# Patient Record
Sex: FEMALE | Race: BLACK OR AFRICAN AMERICAN | NOT HISPANIC OR LATINO | URBAN - METROPOLITAN AREA
[De-identification: names, ages, dates, MRNs, and addresses within clinical notes are randomized per-mention and may not be internally consistent; named-entity substitution may affect disease eponyms.]

---

## 2024-04-13 ENCOUNTER — EMERGENCY (EMERGENCY)
Facility: HOSPITAL | Age: 64
LOS: 0 days | Discharge: HOME | End: 2024-04-14
Attending: EMERGENCY MEDICINE | Admitting: HOSPITALIST
Payer: COMMERCIAL

## 2024-04-13 VITALS
DIASTOLIC BLOOD PRESSURE: 100 MMHG | HEART RATE: 84 BPM | TEMPERATURE: 98 F | RESPIRATION RATE: 16 BRPM | SYSTOLIC BLOOD PRESSURE: 183 MMHG | HEIGHT: 66 IN | WEIGHT: 169.98 LBS | OXYGEN SATURATION: 98 %

## 2024-04-13 DIAGNOSIS — I10 ESSENTIAL (PRIMARY) HYPERTENSION: ICD-10-CM

## 2024-04-13 DIAGNOSIS — R07.89 OTHER CHEST PAIN: ICD-10-CM

## 2024-04-13 DIAGNOSIS — R53.83 OTHER FATIGUE: ICD-10-CM

## 2024-04-13 DIAGNOSIS — R51.9 HEADACHE, UNSPECIFIED: ICD-10-CM

## 2024-04-13 DIAGNOSIS — R90.89 OTHER ABNORMAL FINDINGS ON DIAGNOSTIC IMAGING OF CENTRAL NERVOUS SYSTEM: ICD-10-CM

## 2024-04-13 DIAGNOSIS — I16.0 HYPERTENSIVE URGENCY: ICD-10-CM

## 2024-04-13 DIAGNOSIS — R53.1 WEAKNESS: ICD-10-CM

## 2024-04-13 LAB
ALBUMIN SERPL ELPH-MCNC: 3.5 G/DL — SIGNIFICANT CHANGE UP (ref 3.3–5)
ALP SERPL-CCNC: 99 U/L — SIGNIFICANT CHANGE UP (ref 40–120)
ALT FLD-CCNC: 19 U/L — SIGNIFICANT CHANGE UP (ref 12–78)
ANION GAP SERPL CALC-SCNC: 6 MMOL/L — SIGNIFICANT CHANGE UP (ref 5–17)
APPEARANCE UR: CLEAR — SIGNIFICANT CHANGE UP
APTT BLD: 30.3 SEC — SIGNIFICANT CHANGE UP (ref 24.5–35.6)
AST SERPL-CCNC: 13 U/L — LOW (ref 15–37)
BACTERIA # UR AUTO: ABNORMAL /HPF
BASOPHILS # BLD AUTO: 0.04 K/UL — SIGNIFICANT CHANGE UP (ref 0–0.2)
BASOPHILS NFR BLD AUTO: 1.1 % — SIGNIFICANT CHANGE UP (ref 0–2)
BILIRUB SERPL-MCNC: 0.5 MG/DL — SIGNIFICANT CHANGE UP (ref 0.2–1.2)
BILIRUB UR-MCNC: NEGATIVE — SIGNIFICANT CHANGE UP
BUN SERPL-MCNC: 12 MG/DL — SIGNIFICANT CHANGE UP (ref 7–23)
CALCIUM SERPL-MCNC: 9.2 MG/DL — SIGNIFICANT CHANGE UP (ref 8.5–10.1)
CHLORIDE SERPL-SCNC: 112 MMOL/L — HIGH (ref 96–108)
CK MB BLD-MCNC: <1.2 % — SIGNIFICANT CHANGE UP (ref 0–3.5)
CK MB CFR SERPL CALC: <1 NG/ML — SIGNIFICANT CHANGE UP (ref 0.5–3.6)
CK SERPL-CCNC: 85 U/L — SIGNIFICANT CHANGE UP (ref 26–192)
CO2 SERPL-SCNC: 26 MMOL/L — SIGNIFICANT CHANGE UP (ref 22–31)
COLOR SPEC: YELLOW — SIGNIFICANT CHANGE UP
CREAT SERPL-MCNC: 0.85 MG/DL — SIGNIFICANT CHANGE UP (ref 0.5–1.3)
DIFF PNL FLD: NEGATIVE — SIGNIFICANT CHANGE UP
EGFR: 76 ML/MIN/1.73M2 — SIGNIFICANT CHANGE UP
EOSINOPHIL # BLD AUTO: 0.09 K/UL — SIGNIFICANT CHANGE UP (ref 0–0.5)
EOSINOPHIL NFR BLD AUTO: 2.4 % — SIGNIFICANT CHANGE UP (ref 0–6)
GLUCOSE SERPL-MCNC: 93 MG/DL — SIGNIFICANT CHANGE UP (ref 70–99)
GLUCOSE UR QL: NEGATIVE MG/DL — SIGNIFICANT CHANGE UP
HCT VFR BLD CALC: 38.9 % — SIGNIFICANT CHANGE UP (ref 34.5–45)
HGB BLD-MCNC: 12.2 G/DL — SIGNIFICANT CHANGE UP (ref 11.5–15.5)
HYALINE CASTS # UR AUTO: PRESENT
IMM GRANULOCYTES NFR BLD AUTO: 0.3 % — SIGNIFICANT CHANGE UP (ref 0–0.9)
INR BLD: 0.94 RATIO — SIGNIFICANT CHANGE UP (ref 0.85–1.18)
KETONES UR-MCNC: NEGATIVE MG/DL — SIGNIFICANT CHANGE UP
LEUKOCYTE ESTERASE UR-ACNC: NEGATIVE — SIGNIFICANT CHANGE UP
LYMPHOCYTES # BLD AUTO: 1.14 K/UL — SIGNIFICANT CHANGE UP (ref 1–3.3)
LYMPHOCYTES # BLD AUTO: 30.2 % — SIGNIFICANT CHANGE UP (ref 13–44)
MAGNESIUM SERPL-MCNC: 2.1 MG/DL — SIGNIFICANT CHANGE UP (ref 1.6–2.6)
MCHC RBC-ENTMCNC: 25.1 PG — LOW (ref 27–34)
MCHC RBC-ENTMCNC: 31.4 G/DL — LOW (ref 32–36)
MCV RBC AUTO: 80 FL — SIGNIFICANT CHANGE UP (ref 80–100)
MONOCYTES # BLD AUTO: 0.29 K/UL — SIGNIFICANT CHANGE UP (ref 0–0.9)
MONOCYTES NFR BLD AUTO: 7.7 % — SIGNIFICANT CHANGE UP (ref 2–14)
NEUTROPHILS # BLD AUTO: 2.2 K/UL — SIGNIFICANT CHANGE UP (ref 1.8–7.4)
NEUTROPHILS NFR BLD AUTO: 58.3 % — SIGNIFICANT CHANGE UP (ref 43–77)
NITRITE UR-MCNC: NEGATIVE — SIGNIFICANT CHANGE UP
NRBC # BLD: 0 /100 WBCS — SIGNIFICANT CHANGE UP (ref 0–0)
NT-PROBNP SERPL-SCNC: 38 PG/ML — SIGNIFICANT CHANGE UP (ref 0–125)
PH UR: 7 — SIGNIFICANT CHANGE UP (ref 5–8)
PLATELET # BLD AUTO: 262 K/UL — SIGNIFICANT CHANGE UP (ref 150–400)
POTASSIUM SERPL-MCNC: 3.5 MMOL/L — SIGNIFICANT CHANGE UP (ref 3.5–5.3)
POTASSIUM SERPL-SCNC: 3.5 MMOL/L — SIGNIFICANT CHANGE UP (ref 3.5–5.3)
PROT SERPL-MCNC: 7.1 GM/DL — SIGNIFICANT CHANGE UP (ref 6–8.3)
PROT UR-MCNC: NEGATIVE MG/DL — SIGNIFICANT CHANGE UP
PROTHROM AB SERPL-ACNC: 11.3 SEC — SIGNIFICANT CHANGE UP (ref 9.5–13)
RBC # BLD: 4.86 M/UL — SIGNIFICANT CHANGE UP (ref 3.8–5.2)
RBC # FLD: 14.1 % — SIGNIFICANT CHANGE UP (ref 10.3–14.5)
RBC CASTS # UR COMP ASSIST: 4 /HPF — SIGNIFICANT CHANGE UP (ref 0–4)
SODIUM SERPL-SCNC: 144 MMOL/L — SIGNIFICANT CHANGE UP (ref 135–145)
SP GR SPEC: 1.02 — SIGNIFICANT CHANGE UP (ref 1–1.03)
TROPONIN I, HIGH SENSITIVITY RESULT: 3.8 NG/L — SIGNIFICANT CHANGE UP
UROBILINOGEN FLD QL: 1 MG/DL — SIGNIFICANT CHANGE UP (ref 0.2–1)
WBC # BLD: 3.77 K/UL — LOW (ref 3.8–10.5)
WBC # FLD AUTO: 3.77 K/UL — LOW (ref 3.8–10.5)
WBC UR QL: 6 /HPF — HIGH (ref 0–5)

## 2024-04-13 PROCEDURE — 70450 CT HEAD/BRAIN W/O DYE: CPT | Mod: 26,MC

## 2024-04-13 PROCEDURE — 99222 1ST HOSP IP/OBS MODERATE 55: CPT

## 2024-04-13 PROCEDURE — 93010 ELECTROCARDIOGRAM REPORT: CPT

## 2024-04-13 PROCEDURE — 71045 X-RAY EXAM CHEST 1 VIEW: CPT | Mod: 26

## 2024-04-13 PROCEDURE — 99285 EMERGENCY DEPT VISIT HI MDM: CPT

## 2024-04-13 RX ORDER — AMLODIPINE BESYLATE 2.5 MG/1
5 TABLET ORAL DAILY
Refills: 0 | Status: DISCONTINUED | OUTPATIENT
Start: 2024-04-13 | End: 2024-04-14

## 2024-04-13 RX ORDER — HYDRALAZINE HCL 50 MG
5 TABLET ORAL ONCE
Refills: 0 | Status: COMPLETED | OUTPATIENT
Start: 2024-04-13 | End: 2024-04-13

## 2024-04-13 RX ADMIN — Medication 5 MILLIGRAM(S): at 11:20

## 2024-04-13 RX ADMIN — AMLODIPINE BESYLATE 5 MILLIGRAM(S): 2.5 TABLET ORAL at 17:54

## 2024-04-13 NOTE — PATIENT PROFILE ADULT - FUNCTIONAL ASSESSMENT - DAILY ACTIVITY SECTION LABEL
Debridement Text: The wound edges were debrided prior to proceeding with the closure to facilitate wound healing. .

## 2024-04-13 NOTE — H&P ADULT - HISTORY OF PRESENT ILLNESS
Ms. Angeles is a 63 y/o female from home w/ no known PMH presents to the hospital w/ c/o feeling unwell and headache x3 days. Patient reports that she went to Health system today to buy groceries but felt unwell and asked to get her BP checked, however, the machine was broken. Hence, she decided to visit Urgent care but felt too weak that she called 911. She has headaches w/ pressure like sensation behind her eyes, denies other symptoms like nausea, vomiting, focal weakness, blurry vision or other complaints. She has no known hx of HTN but for last few visits she was told that her BP readings were a bit elevated. She denies any chest pain or other symptoms.     In ED, her SBP was noted to be in 180's. She was given hydralazine 5mg IV push.

## 2024-04-13 NOTE — ED PROVIDER NOTE - ACUTE OR EVOLVING MI?
Units: mg Dose Administered (Numbers Only - Mg, G, Mcg, Units, Cc): 0 Medication (1) And Associated J-Code Units: Ustekinumab (Stelara), 1mg Detail Level: None Render J-Code Information In Note?: yes Units: cc Hide Second Medication?: No Consent: The risks of the medication were reviewed with the patient. Route: SC Procedure Information: Please note that the numeric value listed in the Medication (1) and associated J-code units and Medication (2) and associated J-code units variables are j-code amounts and do not represent either the concentration or the total amount of the medications injected.  I strongly recommend selecting no to the Render J-code information in note question. This will allow your note to be more clear. If you are billing j-codes with your injection codes you need to document the total amount of the medication injected. This amount should match the j-code units. For example, if you are injecting Triamcinolone 40mg as an intramuscular injection you would select 40 for the dose field and mg for the units. This would allow you to document  with 4 units (40mg = 10mg x 4). The total volume is not used to calculate j-codes only the amount of the medication administered. Post-Care Instructions: I reviewed with the patient in detail post-care instructions. Patient understands to keep the injection sites clean and call the clinic if there is any redness, swelling or pain. Expiration Date (Optional): July 2023 Lot # (Optional): [id number] Dose Administered (Numbers Only - Mg, G, Mcg, Units, Cc): 45 no

## 2024-04-13 NOTE — ED ADULT NURSE NOTE - NSFALLUNIVINTERV_ED_ALL_ED
Bed/Stretcher in lowest position, wheels locked, appropriate side rails in place/Call bell, personal items and telephone in reach/Instruct patient to call for assistance before getting out of bed/chair/stretcher/Non-slip footwear applied when patient is off stretcher/[place] to call system/Physically safe environment - no spills, clutter or unnecessary equipment/Purposeful proactive rounding/Room/bathroom lighting operational, light cord in reach

## 2024-04-13 NOTE — ED PROVIDER NOTE - PROGRESS NOTE DETAILS
pt is not a candidate for tnk and thrombectomy due to last known well was more than five days ago and NIH scores is zero Dr. Dahl is notified.

## 2024-04-13 NOTE — ED PROVIDER NOTE - CONSTITUTIONAL, MLM
Well appearing, awake, alert, oriented to person, place, time/situation and in no apparent distress. Speaking in clear full sentences no nasal flaring no shoulders retractions no diaphoresis not holding her head/chest/abdomen, appears comfortable lying in the stretcher in a bright light room normal...

## 2024-04-13 NOTE — H&P ADULT - NSHPPHYSICALEXAM_GEN_ALL_CORE
Vital Signs Last 24 Hrs  T(C): 36.8 (13 Apr 2024 18:30), Max: 37.1 (13 Apr 2024 16:37)  T(F): 98.2 (13 Apr 2024 18:30), Max: 98.8 (13 Apr 2024 16:37)  HR: 70 (13 Apr 2024 18:30) (58 - 84)  BP: 144/80 (13 Apr 2024 18:30) (142/73 - 183/100)  BP(mean): 101 (13 Apr 2024 18:30) (101 - 101)  RR: 16 (13 Apr 2024 18:30) (14 - 20)  SpO2: 100% (13 Apr 2024 18:30) (98% - 100%)    Parameters below as of 13 Apr 2024 18:30  Patient On (Oxygen Delivery Method): room air    CONSTITUTIONAL: Well appearing, well nourished, awake, alert and in no apparent distress  ENMT: Airway patent, Nasal mucosa clear. Mouth with normal mucosa. Throat has no vesicles, no oropharyngeal exudates and uvula is midline.  EYES: Clear bilaterally, pupils equal, round and reactive to light. EOMI.  CARDIAC: Normal rate, regular rhythm.  Heart sounds S1, S2.  No murmurs, rubs or gallops   RESPIRATORY: Breath sounds clear and equal bilaterally. No wheezes, rales or rhonchi  MUSCULOSKELETAL: Spine appears normal, range of motion is not limited, no muscle or joint tenderness  EXTREMITIES: No edema, cyanosis or deformity   NEUROLOGICAL: Alert and oriented, no focal deficits, no motor or sensory deficits.  SKIN: No rash, skin turgor

## 2024-04-13 NOTE — ED PROVIDER NOTE - OBJECTIVE STATEMENT
64 years old female here by ems from the [facility] [place] c/o feeling fatigue and sluggish this morning. Pt also c/o intermittent left side chest discomfort/pressure pain 3/10 for two days Pt also sts she had headache with elevated bp two days ago. Pt now pt denies headache, recent hx of traveling or trauma, dizziness, blurred visions, light sensitivities, focal/distal weakness or numbness, neck/back/hips/calfs pain, cough, sob, nausea, vomiting, fever, chills, abd pain, dysuria or irregular bowel movements. Pt sts she has no family hx of heart attack pt never smoke. 64 years old female here by ems from the [facility] [place] c/o feeling fatigue and sluggish this morning However she sts she has had feeling sluggish for about 5 days. Pt also c/o intermittent left side chest discomfort/pressure pain 3/10 for two days Pt also sts she had headache with elevated bp two days ago. Pt now pt denies headache, recent hx of traveling or trauma, dizziness, blurred visions, light sensitivities, focal/distal weakness or numbness, difficulty to walk or keep balance, neck/back/hips/calfs pain, cough, sob, nausea, vomiting, fever, chills, abd pain, dysuria or irregular bowel movements. Pt sts she has no family hx of heart attack pt never smoke.

## 2024-04-13 NOTE — ED ADULT TRIAGE NOTE - CHIEF COMPLAINT QUOTE
brought by ems for not feeling well . pt states she was at the store and she just did not feel right and she felt a pressure on her chest . no past medical history .

## 2024-04-13 NOTE — ED PROVIDER NOTE - CLINICAL SUMMARY MEDICAL DECISION MAKING FREE TEXT BOX
pt came in with two days of generalized weakness intermittent chest pain and elevated bp ekg no st-t elevation sinus giselle at 58 bpm and LVH. bed side bp 161/98 Labs ct head cxr are ordered. pt came in with two days of generalized weakness intermittent chest pain and elevated bp ekg no st-t elevation sinus giselle at 58 bpm and LVH. bed side bp 161/98 Labs ct head cxr are ordered.  ct head + hypodensity of bilateral occipital lobe possible early infarct. Pt's NIH score is zero and last known well was more than five days ago. Pt will be admitted for mri of brain.  Pt has no focal neuro deficits on examine and NIH score is zero pt can be admitted to Observation

## 2024-04-13 NOTE — ED ADULT NURSE NOTE - OBJECTIVE STATEMENT
Patient A+OX4 presents with chest pain. Patient stated she started to not feel well thursday, she woke up with headache, "felt my pressure was up." Today at [facility], patient she stated she, "felt chest pressure, fatigue, and like I had a lack of oxygen." Patient describes the c/p as 3/10, pressure like, denies any SOB, TORRES, dizziness, back pain. patient placed on cardiac monitor

## 2024-04-13 NOTE — H&P ADULT - ASSESSMENT
Ms. Angeles is a 65 y/o female from home w/ no known PMH presents to the hospital w/ c/o feeling unwell and headache x3 days, found to have elevated SBP in 180's. Admitted for HTN Urgency     A/P:  #HTN Urgency   #Abnormal CT head  #DVT ppx     Plan:  -Pt w/ elevated SBP in 180. CT head-Subtle hypodensity in the bilateral occipital lobes. Cannot exclude early infarction or posterior reversible encephalopathy versus artifact  -s/p hydralazine in ED- BP improved.   -CT findings could be related to PRES- will get MR head for further evaluation  -Start CCB, monitor VS

## 2024-04-14 VITALS
SYSTOLIC BLOOD PRESSURE: 136 MMHG | TEMPERATURE: 99 F | OXYGEN SATURATION: 100 % | DIASTOLIC BLOOD PRESSURE: 79 MMHG | RESPIRATION RATE: 19 BRPM | HEART RATE: 63 BPM

## 2024-04-14 LAB
HCV AB S/CO SERPL IA: 0.08 S/CO — SIGNIFICANT CHANGE UP (ref 0–0.99)
HCV AB SERPL-IMP: SIGNIFICANT CHANGE UP

## 2024-04-14 PROCEDURE — 70544 MR ANGIOGRAPHY HEAD W/O DYE: CPT | Mod: 26,59

## 2024-04-14 PROCEDURE — 70496 CT ANGIOGRAPHY HEAD: CPT | Mod: 26,MC

## 2024-04-14 PROCEDURE — 70498 CT ANGIOGRAPHY NECK: CPT | Mod: 26,MC

## 2024-04-14 PROCEDURE — 70549 MR ANGIOGRAPH NECK W/O&W/DYE: CPT | Mod: 26,MC

## 2024-04-14 PROCEDURE — 99235 HOSP IP/OBS SAME DATE MOD 70: CPT

## 2024-04-14 PROCEDURE — 70551 MRI BRAIN STEM W/O DYE: CPT | Mod: 26,MC

## 2024-04-14 PROCEDURE — 70450 CT HEAD/BRAIN W/O DYE: CPT | Mod: 26,MC,59

## 2024-04-14 RX ORDER — AMLODIPINE BESYLATE 2.5 MG/1
1 TABLET ORAL
Qty: 30 | Refills: 0
Start: 2024-04-14 | End: 2024-05-13

## 2024-04-14 RX ADMIN — AMLODIPINE BESYLATE 5 MILLIGRAM(S): 2.5 TABLET ORAL at 08:41

## 2024-04-14 NOTE — DISCHARGE NOTE PROVIDER - NSDCCPCAREPLAN_GEN_ALL_CORE_FT
PRINCIPAL DISCHARGE DIAGNOSIS  Diagnosis: Generalized weakness  Assessment and Plan of Treatment: Ms. Angeles is a 63 y/o female from home w/ no known PMH presents to the hospital w/ c/o feeling unwell and headache x3 days, found to have elevated SBP in 180's. Admitted for HTN Urgency   A/P:  #HTN Urgency   #Abnormal CT head  #DVT ppx   Plan:  -Pt w/ elevated SBP in 180. CT head-Subtle hypodensity in the bilateral occipital lobes. Cannot exclude early infarction or posterior reversible encephalopathy versus artifact   MRI/MRA results  No acute intracranial hemorrhage or acute infarct.  No hemodynamically significant stenosis.  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 CTA head recommended for further   characterization   CTA head and neck done and showed no aneurysm   -symptoms likely related to uncontrolled hypertension   -  will need amlodipine on discharge and pcp follow up

## 2024-04-14 NOTE — DISCHARGE NOTE PROVIDER - HOSPITAL COURSE
Ms. Angeles is a 63 y/o female from home w/ no known PMH presents to the hospital w/ c/o feeling unwell and headache x3 days, found to have elevated SBP in 180's. Admitted for HTN Urgency     A/P:  #HTN Urgency   #Abnormal CT head  #DVT ppx     Plan:  -Pt w/ elevated SBP in 180. CT head-Subtle hypodensity in the bilateral occipital lobes. Cannot exclude early infarction or posterior reversible encephalopathy versus artifact     MRI/MRA results  No acute intracranial hemorrhage or acute infarct.    No hemodynamically significant stenosis.    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 CTA head recommended for further   characterization     CTA head and neck done and showed no aneurysm   -symptoms likely related to uncontrolled hypertension   -  will need amlodipine on discharge and pcp follow up

## 2024-04-14 NOTE — DISCHARGE NOTE NURSING/CASE MANAGEMENT/SOCIAL WORK - NSDCPEFALRISK_GEN_ALL_CORE
For information on Fall & Injury Prevention, visit: [email or web address] OR  [email or web address] OR  https://www.cdc.gov/steadi/patient.html

## 2024-04-14 NOTE — DISCHARGE NOTE NURSING/CASE MANAGEMENT/SOCIAL WORK - PATIENT PORTAL LINK FT
You can access the FollowMyHealth Patient Portal offered by [facility] by registering at the following website: [email or web address]. By joining [facility]’s FollowMyHealth portal, you will also be able to view your health information using other applications (apps) compatible with our system.

## 2024-04-14 NOTE — PROGRESS NOTE ADULT - ASSESSMENT
Ms. Angeles is a 65 y/o female from home w/ no known PMH presents to the hospital w/ c/o feeling unwell and headache x3 days, found to have elevated SBP in 180's. Admitted for HTN Urgency     A/P:  #HTN Urgency   #Abnormal CT head  #DVT ppx     Plan:  -Pt w/ elevated SBP in 180. CT head-Subtle hypodensity in the bilateral occipital lobes. Cannot exclude early infarction or posterior reversible encephalopathy versus artifact     MRI/MRA results  No acute intracranial hemorrhage or acute infarct.    No hemodynamically significant stenosis.    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 CTA head recommended for further   characterization    - f/u CTA   - possible dc after with neurosurgery as this is an incidental finding depending on results   -symptoms likely related to uncontrolled hypertension   -  will need amlodipine on discharge and pcp follow up

## 2024-04-14 NOTE — PROGRESS NOTE ADULT - SUBJECTIVE AND OBJECTIVE BOX
Patient is a 64y old  Female who presents with a chief complaint of HTN Urgency (2024 17:34)      INTERVAL HPI/OVERNIGHT EVENTS: feeling much better. no complaints.     MEDICATIONS  (STANDING):  amLODIPine   Tablet 5 milliGRAM(s) Oral daily    MEDICATIONS  (PRN):      Allergies    No Known Allergies    Intolerances        REVIEW OF SYSTEMS:  CONSTITUTIONAL: No fever, weight loss  EYES: No eye pain, visual disturbances, or discharge  ENMT:  No difficulty hearing, tinnitus, vertigo; No sinus or throat pain  RESPIRATORY: No cough, wheezing, chills or hemoptysis; No shortness of breath  CARDIOVASCULAR: No chest pain, palpitations, dizziness, or leg swelling  GASTROINTESTINAL: No abdominal or epigastric pain. No nausea, vomiting, or hematemesis; No diarrhea or constipation. No melena or hematochezia.  GENITOURINARY: No dysuria, frequency, hematuria, or incontinence  NEUROLOGICAL: No headaches, memory loss, loss of strength, numbness, or tremors  SKIN: No itching, burning, rashes, or lesions   MUSCULOSKELETAL: No joint pain or swelling; No muscle, back, or extremity pain  PSYCHIATRIC: No depression, anxiety, mood swings, or difficulty sleeping  HEME/LYMPH: No easy bruising, or bleeding gums      Vital Signs Last 24 Hrs  T(C): 36.9 (2024 10:22), Max: 37.1 (2024 16:37)  T(F): 98.4 (2024 10:22), Max: 98.8 (2024 16:37)  HR: 56 (2024 10:22) (51 - 77)  BP: 139/86 (2024 10:22) (109/62 - 152/83)  BP(mean): 101 (2024 18:30) (101 - 101)  RR: 17 (2024 10:22) (14 - 20)  SpO2: 99% (2024 10:22) (97% - 100%)    Parameters below as of 2024 10:22  Patient On (Oxygen Delivery Method): room air        PHYSICAL EXAM:  GENERAL: NAD  HEAD:  Atraumatic, Normocephalic  EYES: EOMI, PERRLA, conjunctiva and sclera clear  ENMT: No tonsillar erythema, exudates, or enlargement;   NECK: Supple, Normal thyroid  NERVOUS SYSTEM:  Alert & Oriented X3, Good concentration; Motor Strength 5/5 B/L upper and lower extremities; DTRs 2+ intact and symmetric  CHEST/LUNG: CTABL; No rales, rhonchi, wheezing, or rubs  HEART: Regular rate and rhythm; No murmurs, rubs, or gallops  ABDOMEN: Soft, Nontender, Nondistended; Bowel sounds present  EXTREMITIES:  2+ Peripheral Pulses, No clubbing, cyanosis, or edema  LYMPH: No lymphadenopathy noted  SKIN: No rashes or lesions    LABS:                        12.2   3.77  )-----------( 262      ( 2024 11:13 )             38.9     04-13    144  |  112<H>  |  12  ----------------------------<  93  3.5   |  26  |  0.85    Ca    9.2      2024 11:13  Mg     2.1     04-13    TPro  7.1  /  Alb  3.5  /  TBili  0.5  /  DBili  x   /  AST  13<L>  /  ALT  19  /  AlkPhos  99  04-13    PT/INR - ( 2024 11:13 )   PT: 11.3 sec;   INR: 0.94 ratio         PTT - ( 2024 11:13 )  PTT:30.3 sec  Urinalysis Basic - ( 2024 18:05 )    Color: Yellow / Appearance: Clear / S.020 / pH: x  Gluc: x / Ketone: Negative mg/dL  / Bili: Negative / Urobili: 1.0 mg/dL   Blood: x / Protein: Negative mg/dL / Nitrite: Negative   Leuk Esterase: Negative / RBC: 4 /HPF / WBC 6 /HPF   Sq Epi: x / Non Sq Epi: x / Bacteria: Few /HPF      CAPILLARY BLOOD GLUCOSE          RADIOLOGY & ADDITIONAL TESTS:    Imaging Personally Reviewed:  [ ] YES  [ ] NO    Consultant(s) Notes Reviewed:  [ ] YES  [ ] NO    Care Discussed with Consultants/Other Providers [ ] YES  [ ] NO

## 2024-04-15 LAB
CULTURE RESULTS: ABNORMAL
SPECIMEN SOURCE: SIGNIFICANT CHANGE UP